# Patient Record
Sex: FEMALE | Race: WHITE | NOT HISPANIC OR LATINO | Employment: PART TIME | URBAN - METROPOLITAN AREA
[De-identification: names, ages, dates, MRNs, and addresses within clinical notes are randomized per-mention and may not be internally consistent; named-entity substitution may affect disease eponyms.]

---

## 2023-06-27 DIAGNOSIS — J01.21 ACUTE RECURRENT ETHMOIDAL SINUSITIS: ICD-10-CM

## 2023-06-28 RX ORDER — ALBUTEROL SULFATE 90 UG/1
AEROSOL, METERED RESPIRATORY (INHALATION)
Qty: 8.5 G | Refills: 0 | Status: SHIPPED | OUTPATIENT
Start: 2023-06-28 | End: 2024-01-23

## 2023-10-19 ENCOUNTER — HOSPITAL ENCOUNTER (OUTPATIENT)
Dept: RADIOLOGY | Facility: EXTERNAL LOCATION | Age: 58
Discharge: HOME | End: 2023-10-19

## 2023-10-19 ENCOUNTER — OFFICE VISIT (OUTPATIENT)
Dept: PRIMARY CARE | Facility: CLINIC | Age: 58
End: 2023-10-19
Payer: COMMERCIAL

## 2023-10-19 VITALS
HEIGHT: 61 IN | DIASTOLIC BLOOD PRESSURE: 97 MMHG | OXYGEN SATURATION: 96 % | BODY MASS INDEX: 23.41 KG/M2 | HEART RATE: 96 BPM | SYSTOLIC BLOOD PRESSURE: 147 MMHG | WEIGHT: 124 LBS | TEMPERATURE: 97.2 F

## 2023-10-19 DIAGNOSIS — F17.200 NICOTINE DEPENDENCE WITH CURRENT USE: ICD-10-CM

## 2023-10-19 DIAGNOSIS — Z12.31 ENCOUNTER FOR SCREENING MAMMOGRAM FOR MALIGNANT NEOPLASM OF BREAST: ICD-10-CM

## 2023-10-19 DIAGNOSIS — I10 BENIGN ESSENTIAL HYPERTENSION: ICD-10-CM

## 2023-10-19 DIAGNOSIS — Z00.00 VISIT FOR PREVENTIVE HEALTH EXAMINATION: Primary | ICD-10-CM

## 2023-10-19 DIAGNOSIS — Z12.2 ENCOUNTER FOR SCREENING FOR LUNG CANCER: ICD-10-CM

## 2023-10-19 PROCEDURE — 99214 OFFICE O/P EST MOD 30 MIN: CPT | Performed by: FAMILY MEDICINE

## 2023-10-19 PROCEDURE — 3080F DIAST BP >= 90 MM HG: CPT | Performed by: FAMILY MEDICINE

## 2023-10-19 PROCEDURE — 3077F SYST BP >= 140 MM HG: CPT | Performed by: FAMILY MEDICINE

## 2023-10-19 PROCEDURE — 99396 PREV VISIT EST AGE 40-64: CPT | Performed by: FAMILY MEDICINE

## 2023-10-19 RX ORDER — AMLODIPINE BESYLATE 5 MG/1
5 TABLET ORAL DAILY
COMMUNITY
End: 2023-10-19 | Stop reason: ALTCHOICE

## 2023-10-19 RX ORDER — AMLODIPINE AND OLMESARTAN MEDOXOMIL 10; 20 MG/1; MG/1
1 TABLET ORAL DAILY
Qty: 30 TABLET | Refills: 0 | Status: SHIPPED | OUTPATIENT
Start: 2023-10-19 | End: 2023-12-12

## 2023-10-19 NOTE — PROGRESS NOTES
Subjective   Patient ID: Carla Jalloh is a 58 y.o. female who presents for Annual Exam.    Assessment/Plan     Problem List Items Addressed This Visit    None  Visit Diagnoses       Visit for preventive health examination    -  Primary    Relevant Orders    TSH with reflex to Free T4 if abnormal    Lipid Panel    Comprehensive Metabolic Panel    CBC    Urinalysis with Reflex Microscopic    CT cardiac scoring wo IV contrast    CT lung screening low dose    Encounter for screening mammogram for malignant neoplasm of breast        Relevant Orders    BI mammo bilateral screening tomosynthesis (Completed)    Encounter for screening for lung cancer        Relevant Orders    CT lung screening low dose    Benign essential hypertension        Relevant Medications    amlodipine-olmesartan (Parrish) 10-20 mg tablet    Nicotine dependence with current use            Previously patient was advised to get all screenings -patient did not do anything   mammogram requisition provided  Told her to do Pap smear advised to see OB/GYN  Continue current medication  Requisition provided for CT chest lung cancer screening  Information provided  Coronary calcium score requisition provided  Pneumonia vaccine on next visit  Advised to get COVID-19 vaccine  Follow-up in a month  Discussed in detail about smoking cessation  Patient understands and in agreement plan.    HPI    58-year-old female here for physical and follow-up onhypertension -     Following up here after 1-1/2-year     Still smoking half pack to 1 pack/day history of smoking more than 40 years     Hypertension not well controlled we will switch medication  Follow-up in 2 to 4 weeks we will check BMP at that time  Fasting lab work ordered today       Did not do any preventive screening  Occasional alcohol use no drug use  No new sign and symptoms  Was seen by ENT status post laryngeal biopsy for persistent hoarseness of voice    No Known Allergies    Current Outpatient Medications  "  Medication Sig Dispense Refill    albuterol 90 mcg/actuation inhaler INHALE 1 TO 2 PUFFS BY MOUTH EVERY 4 TO 6 HOURS AS NEEDED 8.5 g 0    amlodipine-olmesartan (Parrish) 10-20 mg tablet Take 1 tablet by mouth once daily. 30 tablet 0     No current facility-administered medications for this visit.       Objective   Visit Vitals  BP (!) 147/97 (BP Location: Left arm, Patient Position: Sitting)   Pulse 96   Temp 36.2 °C (97.2 °F)   Ht 1.549 m (5' 1\")   Wt 56.2 kg (124 lb)   SpO2 96%   BMI 23.43 kg/m²   Smoking Status Every Day   BSA 1.56 m²     Physical Exam    Constitutional   General appearance: Alert and in no acute distress.   Head and Face   Head and face: Normal.     Palpation of the face and sinuses: Normal.    Eyes   Inspection of eyes: Sclera and conjunctiva were normal.    Pupil exam: Pupils were equal in size. Extraocular movements were intact.   Ears, Nose, Mouth, and Throat   Ears: Auricles: Normal.    Otoscopic examination: Tympanic membranes: Normal with no congestion and no discharge. Otic Canals: Normal without tenderness, congestion or discharge.    Hearing: Normal.     Nasal mucosa, septum, and turbinates: Normal without edema or erythema.    Lips, teeth, and gums: Normal.    Oropharynx: Normal with moist mucus membranes, no congestion. Tonsils: Normal no follicles.   Neck   Neck Exam: Appearance of the neck was normal. No neck masses observed.    Thyroid exam: Not enlarged and no palpable thyroid nodules.   Pulmonary   Respiratory assessment: No respiratory distress, normal respiratory rhythm and effort.    Auscultation of Lungs: Clear bilateral breath sounds.   Cardiovascular   Auscultation of heart: Apical pulse normal, heart rate and rhythm normal, normal S1 and S2, no murmurs and no pericardial rub.    Carotid arteries: Pulses normal with no bruits.    Exam for edema: No peripheral edema.   Chest   Chest: Normal A_P diameter, no pulsation, no intercostal withdrawing. Trachea central.   Abdomen "   Abdominal Exam: No bruits, normal bowel sounds, soft, non-tender, no abdominal mass palpated.    Liver and Spleen exam: No hepato-splenomegaly.    Examination for hernias: Normal.      Lymphatic   Palpation of lymph nodes in neck: No cervical lymphadenopathy.   Musculoskeletal   Examination of gait: Normal.    Inspection of digits and nails: No clubbing or cyanosis of the fingernails.    Inspection/palpation of joints, bones and muscles: No joint swelling. Normal movement of all extremities.    Range of Motion: Normal movement of all extremities.   Skin   Skin inspection: Normal skin color and pigmentation, normal skin turgor and no visible rash.   Neurologic   Cranial nerves: Nerves 2-12 were intact, no focal neuro defects.    Reflexes: Normal.     Sensation: Normal.     Coordination: Normal.    Psychiatric   Judgment and insight: Intact.    Orientation: Oriented to person, place, and time.    Recent and remote memory: Normal.     Mood and affect: Normal.     Genitourinary -follow-up with OB/GYN     Immunization History   Administered Date(s) Administered    Moderna SARS-CoV-2 Vaccination 05/06/2021, 05/31/2021       Review of Systems    No visits with results within 4 Month(s) from this visit.   Latest known visit with results is:   Legacy Encounter on 03/24/2021   Component Date Value Ref Range Status    WBC 03/24/2021 10.6  4.4 - 11.3 x10E9/L Final    nRBC 03/24/2021 0.0  0.0 - 0.0 /100 WBC Final    RBC 03/24/2021 4.33  4.00 - 5.20 x10E12/L Final    Hemoglobin 03/24/2021 14.1  12.0 - 16.0 g/dL Final    Hematocrit 03/24/2021 42.7  36.0 - 46.0 % Final    MCV 03/24/2021 99  80 - 100 fL Final    MCHC 03/24/2021 33.0  32.0 - 36.0 g/dL Final    Platelets 03/24/2021 321  150 - 450 x10E9/L Final    RDW 03/24/2021 12.7  11.5 - 14.5 % Final    Glucose 03/24/2021 91  74 - 99 mg/dL Final    Sodium 03/24/2021 139  136 - 145 mmol/L Final    Potassium 03/24/2021 4.4  3.5 - 5.3 mmol/L Final    Chloride 03/24/2021 103  98 -  107 mmol/L Final    Bicarbonate 03/24/2021 29  21 - 32 mmol/L Final    Anion Gap 03/24/2021 11  10 - 20 mmol/L Final    Urea Nitrogen 03/24/2021 11  6 - 23 mg/dL Final    Creatinine 03/24/2021 0.73  0.50 - 1.05 mg/dL Final    GLOMERULAR FILTRATION RATE-NON AFR* 03/24/2021 >60  >60 mL/min/1.73m2 Final    GLOMERULAR FILTRATION RATE-* 03/24/2021 >60  >60 mL/min/1.73m2 Final    Calcium 03/24/2021 10.3  8.6 - 10.6 mg/dL Final    Albumin 03/24/2021 4.4  3.4 - 5.0 g/dL Final    Alkaline Phosphatase 03/24/2021 101  33 - 110 U/L Final    Total Protein 03/24/2021 7.4  6.4 - 8.2 g/dL Final    AST 03/24/2021 17  9 - 39 U/L Final    Total Bilirubin 03/24/2021 0.3  0.0 - 1.2 mg/dL Final    ALT (SGPT) 03/24/2021 19  7 - 45 U/L Final    Cholesterol 03/24/2021 186  0 - 199 mg/dL Final    HDL 03/24/2021 91.6  mg/dL Final    Cholesterol/HDL Ratio 03/24/2021 2.0   Final    LDL 03/24/2021 76  0 - 99 mg/dL Final    VLDL 03/24/2021 19  0 - 40 mg/dL Final    Triglycerides 03/24/2021 93  0 - 149 mg/dL Final    Color, Urine 03/24/2021 YELLOW  STRAW,YELLOW Final    Appearance, Urine 03/24/2021 CLEAR  CLEAR Final    Specific Gravity, Urine 03/24/2021 1.014  1.005 - 1.035 Final    pH, Urine 03/24/2021 5.0  5.0 - 8.0 Final    Protein, Urine 03/24/2021 NEGATIVE  NEGATIVE mg/dL Final    Glucose, Urine 03/24/2021 NEGATIVE  NEGATIVE mg/dL Final    Blood, Urine 03/24/2021 NEGATIVE  NEGATIVE Final    Ketones, Urine 03/24/2021 NEGATIVE  NEGATIVE mg/dL Final    Bilirubin, Urine 03/24/2021 NEGATIVE  NEGATIVE Final    Urobilinogen, Urine 03/24/2021 <2.0  0.0 - 1.9 mg/dL Final    Nitrite, Urine 03/24/2021 NEGATIVE  NEGATIVE Final    Leukocyte Esterase, Urine 03/24/2021 NEGATIVE  NEGATIVE Final    TSH 03/24/2021 1.15  0.44 - 3.98 mIU/L Final    Vitamin D, 25-Hydroxy 03/24/2021 29 (A)  ng/mL Final    Total CK 03/24/2021 70  0 - 215 U/L Final       Radiology: Reviewed imaging in powerchart.  No results found.    No family history on file.  Social  History     Socioeconomic History    Marital status:      Spouse name: None    Number of children: None    Years of education: None    Highest education level: None   Occupational History    None   Tobacco Use    Smoking status: Every Day     Packs/day: 0.50     Years: 40.00     Additional pack years: 0.00     Total pack years: 20.00     Types: Cigarettes    Smokeless tobacco: Never   Substance and Sexual Activity    Alcohol use: Yes     Alcohol/week: 12.0 standard drinks of alcohol     Types: 3 Glasses of wine, 9 Cans of beer per week    Drug use: Never    Sexual activity: None   Other Topics Concern    None   Social History Narrative    None     Social Determinants of Health     Financial Resource Strain: Not on file   Food Insecurity: Not on file   Transportation Needs: Not on file   Physical Activity: Not on file   Stress: Not on file   Social Connections: Not on file   Intimate Partner Violence: Not on file   Housing Stability: Not on file     Past Medical History:   Diagnosis Date    Personal history of other diseases of the circulatory system     History of hypertension     Past Surgical History:   Procedure Laterality Date    OTHER SURGICAL HISTORY  02/03/2017    Excision Of Leg Soft Tissue Tumor    OTHER SURGICAL HISTORY  11/25/2019    Tooth extraction       Charting was completed using voice recognition technology and may include unintended errors.

## 2023-10-30 ENCOUNTER — LAB (OUTPATIENT)
Dept: LAB | Facility: LAB | Age: 58
End: 2023-10-30
Payer: COMMERCIAL

## 2023-10-30 DIAGNOSIS — Z00.00 VISIT FOR PREVENTIVE HEALTH EXAMINATION: ICD-10-CM

## 2023-10-30 LAB
ALBUMIN SERPL BCP-MCNC: 4.5 G/DL (ref 3.4–5)
ALP SERPL-CCNC: 111 U/L (ref 33–110)
ALT SERPL W P-5'-P-CCNC: 27 U/L (ref 7–45)
ANION GAP SERPL CALC-SCNC: 15 MMOL/L (ref 10–20)
APPEARANCE UR: CLEAR
AST SERPL W P-5'-P-CCNC: 18 U/L (ref 9–39)
BILIRUB SERPL-MCNC: 0.5 MG/DL (ref 0–1.2)
BILIRUB UR STRIP.AUTO-MCNC: NEGATIVE MG/DL
BUN SERPL-MCNC: 12 MG/DL (ref 6–23)
CALCIUM SERPL-MCNC: 9.9 MG/DL (ref 8.6–10.6)
CHLORIDE SERPL-SCNC: 107 MMOL/L (ref 98–107)
CHOLEST SERPL-MCNC: 209 MG/DL (ref 0–199)
CHOLESTEROL/HDL RATIO: 2.9
CO2 SERPL-SCNC: 23 MMOL/L (ref 21–32)
COLOR UR: YELLOW
CREAT SERPL-MCNC: 0.68 MG/DL (ref 0.5–1.05)
ERYTHROCYTE [DISTWIDTH] IN BLOOD BY AUTOMATED COUNT: 12.8 % (ref 11.5–14.5)
GFR SERPL CREATININE-BSD FRML MDRD: >90 ML/MIN/1.73M*2
GLUCOSE SERPL-MCNC: 93 MG/DL (ref 74–99)
GLUCOSE UR STRIP.AUTO-MCNC: NEGATIVE MG/DL
HCT VFR BLD AUTO: 47 % (ref 36–46)
HDLC SERPL-MCNC: 71.5 MG/DL
HGB BLD-MCNC: 14.9 G/DL (ref 12–16)
KETONES UR STRIP.AUTO-MCNC: NEGATIVE MG/DL
LDLC SERPL CALC-MCNC: 113 MG/DL
LEUKOCYTE ESTERASE UR QL STRIP.AUTO: NEGATIVE
MCH RBC QN AUTO: 32.1 PG (ref 26–34)
MCHC RBC AUTO-ENTMCNC: 31.7 G/DL (ref 32–36)
MCV RBC AUTO: 101 FL (ref 80–100)
NITRITE UR QL STRIP.AUTO: NEGATIVE
NON HDL CHOLESTEROL: 138 MG/DL (ref 0–149)
NRBC BLD-RTO: 0 /100 WBCS (ref 0–0)
PH UR STRIP.AUTO: 5 [PH]
PLATELET # BLD AUTO: 344 X10*3/UL (ref 150–450)
PMV BLD AUTO: 10.5 FL (ref 7.5–11.5)
POTASSIUM SERPL-SCNC: 4.6 MMOL/L (ref 3.5–5.3)
PROT SERPL-MCNC: 7.5 G/DL (ref 6.4–8.2)
PROT UR STRIP.AUTO-MCNC: NEGATIVE MG/DL
RBC # BLD AUTO: 4.64 X10*6/UL (ref 4–5.2)
RBC # UR STRIP.AUTO: NEGATIVE /UL
SODIUM SERPL-SCNC: 140 MMOL/L (ref 136–145)
SP GR UR STRIP.AUTO: 1.01
TRIGL SERPL-MCNC: 121 MG/DL (ref 0–149)
TSH SERPL-ACNC: 1.14 MIU/L (ref 0.44–3.98)
UROBILINOGEN UR STRIP.AUTO-MCNC: <2 MG/DL
VLDL: 24 MG/DL (ref 0–40)
WBC # BLD AUTO: 11.5 X10*3/UL (ref 4.4–11.3)

## 2023-10-30 PROCEDURE — 81003 URINALYSIS AUTO W/O SCOPE: CPT

## 2023-10-30 PROCEDURE — 36415 COLL VENOUS BLD VENIPUNCTURE: CPT

## 2023-10-30 PROCEDURE — 80061 LIPID PANEL: CPT

## 2023-10-30 PROCEDURE — 80053 COMPREHEN METABOLIC PANEL: CPT

## 2023-10-30 PROCEDURE — 84443 ASSAY THYROID STIM HORMONE: CPT

## 2023-10-30 PROCEDURE — 85027 COMPLETE CBC AUTOMATED: CPT

## 2023-11-02 ENCOUNTER — TELEMEDICINE (OUTPATIENT)
Dept: PRIMARY CARE | Facility: CLINIC | Age: 58
End: 2023-11-02
Payer: COMMERCIAL

## 2023-11-02 VITALS — WEIGHT: 124 LBS | HEIGHT: 61 IN | BODY MASS INDEX: 23.41 KG/M2

## 2023-11-02 DIAGNOSIS — F43.23 SITUATIONAL MIXED ANXIETY AND DEPRESSIVE DISORDER: Primary | ICD-10-CM

## 2023-11-02 DIAGNOSIS — G47.00 INSOMNIA, UNSPECIFIED TYPE: ICD-10-CM

## 2023-11-02 PROCEDURE — 99443 PR PHYS/QHP TELEPHONE EVALUATION 21-30 MIN: CPT | Performed by: STUDENT IN AN ORGANIZED HEALTH CARE EDUCATION/TRAINING PROGRAM

## 2023-11-02 RX ORDER — TRAZODONE HYDROCHLORIDE 50 MG/1
50 TABLET ORAL NIGHTLY PRN
Qty: 30 TABLET | Refills: 5 | Status: SHIPPED | OUTPATIENT
Start: 2023-11-02 | End: 2024-11-01

## 2023-11-02 RX ORDER — BUSPIRONE HYDROCHLORIDE 7.5 MG/1
7.5 TABLET ORAL 2 TIMES DAILY
Qty: 60 TABLET | Refills: 1 | Status: SHIPPED | OUTPATIENT
Start: 2023-11-02 | End: 2024-06-07 | Stop reason: SDUPTHER

## 2023-11-02 NOTE — PROGRESS NOTES
"Subjective   Patient ID: Carla Jalloh is a 58 y.o. female who presents for the following    Assessment/Plan   #Situational Anxiety  #Situational Depression  #Insomnia   -PHQ9 is 7; mild depression  -Has insomnia and anxiety about her mom being sick   -May have previous hx of depression but is unsure    PLAN  -Buspar 7.5mg PO BID  -Trazodone 50mg PO at bedtime for sleep   -If symptoms worsen, can start paxil or sertraline for MDD.   -Advised to seek emergency care for SI/HI or return back to PCP if anxiety/depression symptoms to do improve.       Follow up with PCP    HPI  Virtual or Telephone Consent    A telephone visit (audio only) between the patient (at the originating site) and the provider (at the distant site) was utilized to provide this telehealth service.   Verbal consent was requested and obtained from Carla Jalloh on this date, 11/02/23 for a telehealth visit.     58F presents for virtual visit via telephone. She states that her mental state is deteriorating due to stressors in her life. Her mom was recently in the hospital and her work life is stressful. She began to cry at work today due to her current situation and was sent home which has worsened her anxiety. She states that people talking loudly around her make her anxious and angry and that she has become more irritable since her mother got sick. No previous documented hx of depression or anxiety seen.   Denies any SI/HI.  She thinks that if she could get better sleep (only sleeping a few hours a night) then her mood would improve significantly. Appetite is intact.     ROS otherwise negative.      PHQ9: 7; mild     Visit Vitals  Ht 1.549 m (5' 1\")   Wt 56.2 kg (124 lb)   BMI 23.43 kg/m²   Smoking Status Every Day   BSA 1.56 m²     PHYSICAL EXAM     Deferred  REVIEW OF SYSTEMS   In ROS     No Known Allergies    Current Outpatient Medications   Medication Sig Dispense Refill    albuterol 90 mcg/actuation inhaler INHALE 1 TO 2 PUFFS BY MOUTH EVERY " 4 TO 6 HOURS AS NEEDED 8.5 g 0    amlodipine-olmesartan (Parrsih) 10-20 mg tablet Take 1 tablet by mouth once daily. 30 tablet 0     No current facility-administered medications for this visit.       Objective     Lab on 10/30/2023   Component Date Value Ref Range Status    Thyroid Stimulating Hormone 10/30/2023 1.14  0.44 - 3.98 mIU/L Final    Cholesterol 10/30/2023 209 (H)  0 - 199 mg/dL Final    HDL-Cholesterol 10/30/2023 71.5  mg/dL Final    Cholesterol/HDL Ratio 10/30/2023 2.9   Final    LDL Calculated 10/30/2023 113 (H)  <=99 mg/dL Final    VLDL 10/30/2023 24  0 - 40 mg/dL Final    Triglycerides 10/30/2023 121  0 - 149 mg/dL Final    Non HDL Cholesterol 10/30/2023 138  0 - 149 mg/dL Final    Glucose 10/30/2023 93  74 - 99 mg/dL Final    Sodium 10/30/2023 140  136 - 145 mmol/L Final    Potassium 10/30/2023 4.6  3.5 - 5.3 mmol/L Final    Chloride 10/30/2023 107  98 - 107 mmol/L Final    Bicarbonate 10/30/2023 23  21 - 32 mmol/L Final    Anion Gap 10/30/2023 15  10 - 20 mmol/L Final    Urea Nitrogen 10/30/2023 12  6 - 23 mg/dL Final    Creatinine 10/30/2023 0.68  0.50 - 1.05 mg/dL Final    eGFR 10/30/2023 >90  >60 mL/min/1.73m*2 Final    Calcium 10/30/2023 9.9  8.6 - 10.6 mg/dL Final    Albumin 10/30/2023 4.5  3.4 - 5.0 g/dL Final    Alkaline Phosphatase 10/30/2023 111 (H)  33 - 110 U/L Final    Total Protein 10/30/2023 7.5  6.4 - 8.2 g/dL Final    AST 10/30/2023 18  9 - 39 U/L Final    Bilirubin, Total 10/30/2023 0.5  0.0 - 1.2 mg/dL Final    ALT 10/30/2023 27  7 - 45 U/L Final    WBC 10/30/2023 11.5 (H)  4.4 - 11.3 x10*3/uL Final    nRBC 10/30/2023 0.0  0.0 - 0.0 /100 WBCs Final    RBC 10/30/2023 4.64  4.00 - 5.20 x10*6/uL Final    Hemoglobin 10/30/2023 14.9  12.0 - 16.0 g/dL Final    Hematocrit 10/30/2023 47.0 (H)  36.0 - 46.0 % Final    MCV 10/30/2023 101 (H)  80 - 100 fL Final    MCH 10/30/2023 32.1  26.0 - 34.0 pg Final    MCHC 10/30/2023 31.7 (L)  32.0 - 36.0 g/dL Final    RDW 10/30/2023 12.8  11.5 - 14.5 %  Final    Platelets 10/30/2023 344  150 - 450 x10*3/uL Final    MPV 10/30/2023 10.5  7.5 - 11.5 fL Final    Color, Urine 10/30/2023 Yellow  Straw, Yellow Final    Appearance, Urine 10/30/2023 Clear  Clear Final    Specific Gravity, Urine 10/30/2023 1.014  1.005 - 1.035 Final    pH, Urine 10/30/2023 5.0  5.0, 5.5, 6.0, 6.5, 7.0, 7.5, 8.0 Final    Protein, Urine 10/30/2023 NEGATIVE  NEGATIVE mg/dL Final    Glucose, Urine 10/30/2023 NEGATIVE  NEGATIVE mg/dL Final    Blood, Urine 10/30/2023 NEGATIVE  NEGATIVE Final    Ketones, Urine 10/30/2023 NEGATIVE  NEGATIVE mg/dL Final    Bilirubin, Urine 10/30/2023 NEGATIVE  NEGATIVE Final    Urobilinogen, Urine 10/30/2023 <2.0  <2.0 mg/dL Final    Nitrite, Urine 10/30/2023 NEGATIVE  NEGATIVE Final    Leukocyte Esterase, Urine 10/30/2023 NEGATIVE  NEGATIVE Final       Radiology: Reviewed imaging in powerchart.  BI mammo bilateral screening tomosynthesis    Result Date: 10/19/2023  These images are not reportable by radiology and will not be interpreted by  Radiologists.      No family history on file.  Social History     Socioeconomic History    Marital status:      Spouse name: None    Number of children: None    Years of education: None    Highest education level: None   Occupational History    None   Tobacco Use    Smoking status: Every Day     Packs/day: 0.50     Years: 40.00     Additional pack years: 0.00     Total pack years: 20.00     Types: Cigarettes    Smokeless tobacco: Never   Substance and Sexual Activity    Alcohol use: Yes     Alcohol/week: 12.0 standard drinks of alcohol     Types: 3 Glasses of wine, 9 Cans of beer per week    Drug use: Never    Sexual activity: None   Other Topics Concern    None   Social History Narrative    None     Social Determinants of Health     Financial Resource Strain: Not on file   Food Insecurity: Not on file   Transportation Needs: Not on file   Physical Activity: Not on file   Stress: Not on file   Social Connections: Not on  file   Intimate Partner Violence: Not on file   Housing Stability: Not on file     Past Medical History:   Diagnosis Date    Personal history of other diseases of the circulatory system     History of hypertension     Past Surgical History:   Procedure Laterality Date    OTHER SURGICAL HISTORY  02/03/2017    Excision Of Leg Soft Tissue Tumor    OTHER SURGICAL HISTORY  11/25/2019    Tooth extraction       Charting was completed using voice recognition technology and may include unintended errors.

## 2023-11-09 ENCOUNTER — HOSPITAL ENCOUNTER (OUTPATIENT)
Dept: RADIOLOGY | Facility: HOSPITAL | Age: 58
Discharge: HOME | End: 2023-11-09
Payer: COMMERCIAL

## 2023-11-09 DIAGNOSIS — Z00.00 VISIT FOR PREVENTIVE HEALTH EXAMINATION: ICD-10-CM

## 2023-11-09 PROCEDURE — 75571 CT HRT W/O DYE W/CA TEST: CPT

## 2023-11-14 ENCOUNTER — TELEPHONE (OUTPATIENT)
Dept: PRIMARY CARE | Facility: CLINIC | Age: 58
End: 2023-11-14
Payer: COMMERCIAL

## 2023-11-14 DIAGNOSIS — R74.8 CARDIAC ENZYMES ELEVATED: ICD-10-CM

## 2023-11-14 NOTE — TELEPHONE ENCOUNTER
PT STOPPED BY OFFICE. WOULD LIKE TO START ROSUVASTATIN PER RECOMMENDATION. ALSO STATES FOR HIATAL HERNIA PPI WOULD BE RECOMMENDED IN WHICH SHE WOULD LIKE    FRANCIS JANSEN

## 2023-11-14 NOTE — TELEPHONE ENCOUNTER
----- Message from Deion Landers MD sent at 11/14/2023  9:06 AM EST -----  Please call the patient regarding her abnormal result.  With elevated calcium score and with the blood pressure current age and elevated LDL would qualify for low-dose cholesterol medication if patient agrees to start patient on rosuvastatin 5 mg daily.  Monitor for myalgia.  Stay hydrated.

## 2023-11-16 RX ORDER — ROSUVASTATIN CALCIUM 5 MG/1
5 TABLET, COATED ORAL DAILY
Qty: 30 TABLET | Refills: 2 | Status: SHIPPED | OUTPATIENT
Start: 2023-11-16 | End: 2024-05-14

## 2023-12-06 DIAGNOSIS — I10 BENIGN ESSENTIAL HYPERTENSION: ICD-10-CM

## 2023-12-12 DIAGNOSIS — I10 BENIGN ESSENTIAL HYPERTENSION: ICD-10-CM

## 2023-12-12 RX ORDER — AMLODIPINE AND OLMESARTAN MEDOXOMIL 10; 20 MG/1; MG/1
1 TABLET ORAL DAILY
Qty: 90 TABLET | Refills: 2 | Status: SHIPPED | OUTPATIENT
Start: 2023-12-12 | End: 2023-12-12 | Stop reason: SDUPTHER

## 2023-12-13 RX ORDER — AMLODIPINE AND OLMESARTAN MEDOXOMIL 10; 20 MG/1; MG/1
1 TABLET ORAL DAILY
Qty: 90 TABLET | Refills: 2 | Status: SHIPPED | OUTPATIENT
Start: 2023-12-13 | End: 2024-12-12

## 2024-01-19 DIAGNOSIS — J01.21 ACUTE RECURRENT ETHMOIDAL SINUSITIS: ICD-10-CM

## 2024-01-23 RX ORDER — ALBUTEROL SULFATE 90 UG/1
AEROSOL, METERED RESPIRATORY (INHALATION)
Qty: 8.5 G | Refills: 2 | Status: SHIPPED | OUTPATIENT
Start: 2024-01-23

## 2024-06-07 DIAGNOSIS — F43.23 SITUATIONAL MIXED ANXIETY AND DEPRESSIVE DISORDER: ICD-10-CM

## 2024-06-07 RX ORDER — BUSPIRONE HYDROCHLORIDE 7.5 MG/1
7.5 TABLET ORAL 2 TIMES DAILY
Qty: 60 TABLET | Refills: 0 | Status: SHIPPED | OUTPATIENT
Start: 2024-06-07 | End: 2025-06-07

## 2024-07-05 DIAGNOSIS — F43.23 SITUATIONAL MIXED ANXIETY AND DEPRESSIVE DISORDER: ICD-10-CM

## 2024-07-24 RX ORDER — BUSPIRONE HYDROCHLORIDE 7.5 MG/1
7.5 TABLET ORAL 2 TIMES DAILY
Qty: 60 TABLET | Refills: 2 | Status: SHIPPED | OUTPATIENT
Start: 2024-07-24

## 2024-09-13 DIAGNOSIS — R74.8 CARDIAC ENZYMES ELEVATED: ICD-10-CM

## 2024-09-16 RX ORDER — ROSUVASTATIN CALCIUM 5 MG/1
5 TABLET, COATED ORAL DAILY
Qty: 30 TABLET | Refills: 0 | Status: SHIPPED | OUTPATIENT
Start: 2024-09-16 | End: 2024-10-16

## 2025-03-21 ENCOUNTER — APPOINTMENT (OUTPATIENT)
Dept: PRIMARY CARE | Facility: CLINIC | Age: 60
End: 2025-03-21
Payer: COMMERCIAL

## 2025-03-21 VITALS
OXYGEN SATURATION: 98 % | HEIGHT: 61 IN | SYSTOLIC BLOOD PRESSURE: 190 MMHG | WEIGHT: 127 LBS | DIASTOLIC BLOOD PRESSURE: 95 MMHG | BODY MASS INDEX: 23.98 KG/M2 | TEMPERATURE: 97.8 F | HEART RATE: 92 BPM

## 2025-03-21 DIAGNOSIS — Z87.891 HISTORY OF SMOKING 10-25 PACK YEARS: ICD-10-CM

## 2025-03-21 DIAGNOSIS — I10 BENIGN ESSENTIAL HYPERTENSION: Primary | ICD-10-CM

## 2025-03-21 DIAGNOSIS — E78.2 MIXED HYPERLIPIDEMIA: ICD-10-CM

## 2025-03-21 DIAGNOSIS — Z00.00 VISIT FOR PREVENTIVE HEALTH EXAMINATION: ICD-10-CM

## 2025-03-21 DIAGNOSIS — F17.200 NICOTINE DEPENDENCE WITH CURRENT USE: ICD-10-CM

## 2025-03-21 DIAGNOSIS — F43.23 SITUATIONAL MIXED ANXIETY AND DEPRESSIVE DISORDER: ICD-10-CM

## 2025-03-21 PROCEDURE — 99396 PREV VISIT EST AGE 40-64: CPT | Performed by: FAMILY MEDICINE

## 2025-03-21 PROCEDURE — 99214 OFFICE O/P EST MOD 30 MIN: CPT | Performed by: FAMILY MEDICINE

## 2025-03-21 PROCEDURE — 3008F BODY MASS INDEX DOCD: CPT | Performed by: FAMILY MEDICINE

## 2025-03-21 PROCEDURE — 3077F SYST BP >= 140 MM HG: CPT | Performed by: FAMILY MEDICINE

## 2025-03-21 PROCEDURE — 3080F DIAST BP >= 90 MM HG: CPT | Performed by: FAMILY MEDICINE

## 2025-03-21 RX ORDER — ESTRADIOL 0.1 MG/G
CREAM VAGINAL
COMMUNITY
Start: 2023-12-04

## 2025-03-21 RX ORDER — ROSUVASTATIN CALCIUM 5 MG/1
5 TABLET, COATED ORAL DAILY
Qty: 90 TABLET | Refills: 2 | Status: SHIPPED | OUTPATIENT
Start: 2025-03-21 | End: 2026-03-21

## 2025-03-21 RX ORDER — AMLODIPINE AND OLMESARTAN MEDOXOMIL 10; 20 MG/1; MG/1
1 TABLET ORAL DAILY
Qty: 90 TABLET | Refills: 2 | Status: CANCELLED | OUTPATIENT
Start: 2025-03-21 | End: 2026-03-21

## 2025-03-21 RX ORDER — AMLODIPINE AND OLMESARTAN MEDOXOMIL 10; 40 MG/1; MG/1
1 TABLET ORAL DAILY
Qty: 90 TABLET | Refills: 3 | Status: SHIPPED | OUTPATIENT
Start: 2025-03-21 | End: 2026-03-21

## 2025-03-21 RX ORDER — BUSPIRONE HYDROCHLORIDE 10 MG/1
10 TABLET ORAL 2 TIMES DAILY
Qty: 180 TABLET | Refills: 1 | Status: SHIPPED | OUTPATIENT
Start: 2025-03-21

## 2025-03-21 RX ORDER — ALBUTEROL SULFATE 90 UG/1
2 INHALANT RESPIRATORY (INHALATION) EVERY 4 HOURS PRN
Qty: 8.5 G | Refills: 2 | Status: SHIPPED | OUTPATIENT
Start: 2025-03-21

## 2025-03-21 NOTE — PROGRESS NOTES
Subjective   Patient ID: Carla Jalloh is a 59 y.o. female who presents for Annual Exam (Pt is fasting/Declined flu/Hasn't been on statin for 9 months/Only taking bp med her & there).    Assessment/Plan     Problem List Items Addressed This Visit       Visit for preventive health examination    Relevant Orders    TSH with reflex to Free T4 if abnormal    Lipid Panel    Comprehensive Metabolic Panel    CBC    Urinalysis with Reflex Microscopic    Benign essential hypertension - Primary    Relevant Medications    amlodipine-olmesartan (Parrish) 10-40 mg tablet    Other Relevant Orders    TSH with reflex to Free T4 if abnormal    Lipid Panel    Comprehensive Metabolic Panel    CBC    Urinalysis with Reflex Microscopic    Mixed hyperlipidemia    Relevant Medications    rosuvastatin (Crestor) 5 mg tablet    Other Relevant Orders    TSH with reflex to Free T4 if abnormal    Lipid Panel    Comprehensive Metabolic Panel    CBC    Urinalysis with Reflex Microscopic    Situational mixed anxiety and depressive disorder    Relevant Medications    busPIRone (Buspar) 10 mg tablet     Other Visit Diagnoses       Nicotine dependence with current use        Relevant Medications    albuterol 90 mcg/actuation inhaler    History of smoking 10-25 pack years        Relevant Orders    CT lung screening low dose        Previously patient was advised to get all screenings -patient did not do anything   mammogram requisition provided - last in 10/23 - on next visit  Told her to do Pap smear advised to see OB/GYN  Continue current medication  Requisition provided for CT chest lung cancer screening    Coronary calcium score - Total 136.46   Pneumonia vaccine - done  Advised to get COVID-19 vaccine  Follow-up in a month  Discussed in detail about smoking cessation  Patient understands and in agreement plan.    Follow-up in 2 to 4 weeks  Monitor blood pressure at home    HPI    58-year-old female here for physical and follow-up onhypertension -    "  Following up here after 1-1/2-year     Still smoking half pack to 1 pack/day history of smoking more than 40 years     Hypertension not well controlled will increase medication  Advised patient to follow-up in 2 to 4 weeks    Patient is upset and crying about family situation patient has more anxiety will increase BuSpar for now  Consider behavior therapy consultation  Next visit we may consider adding Celexa  Did not do any preventive screening  Occasional alcohol use no drug use  No new sign and symptoms  Was seen by ENT status post laryngeal biopsy for persistent hoarseness of voice    No Known Allergies    Current Outpatient Medications   Medication Sig Dispense Refill   • albuterol 90 mcg/actuation inhaler Inhale 2 puffs every 4 hours if needed for wheezing. 8.5 g 2   • amlodipine-olmesartan (Parrish) 10-40 mg tablet Take 1 tablet by mouth once daily. 90 tablet 3   • busPIRone (Buspar) 10 mg tablet Take 1 tablet (10 mg) by mouth 2 times a day. 180 tablet 1   • estradiol (Estrace) 0.01 % (0.1 mg/gram) vaginal cream See Instructions, 1 g Vaginal HS Mon, Thurs, 42.5 g, Date: 12/4/23 3:24:00 PM EST, [facility], Instructions Replace Required Details, 1 g Vaginal HS Mon, Thurs (Patient not taking: Reported on 3/21/2025)     • rosuvastatin (Crestor) 5 mg tablet Take 1 tablet (5 mg) by mouth once daily. 90 tablet 2     No current facility-administered medications for this visit.       Objective   Visit Vitals  BP (!) 190/95 (BP Location: Left arm, Patient Position: Sitting)   Pulse 92   Temp 36.6 °C (97.8 °F)   Ht 1.549 m (5' 1\")   Wt 57.6 kg (127 lb)   SpO2 98%   BMI 24.00 kg/m²   Smoking Status Every Day   BSA 1.57 m²     Physical Exam    Constitutional   General appearance: Alert and in no acute distress.   Head and Face   Head and face: Normal.     Palpation of the face and sinuses: Normal.    Eyes   Inspection of eyes: Sclera and conjunctiva were normal.    Pupil exam: Pupils were equal in size. Extraocular " movements were intact.   Ears, Nose, Mouth, and Throat   Ears: Auricles: Normal.    Otoscopic examination: Tympanic membranes: Normal with no congestion and no discharge. Otic Canals: Normal without tenderness, congestion or discharge.    Hearing: Normal.     Nasal mucosa, septum, and turbinates: Normal without edema or erythema.    Lips, teeth, and gums: Normal.    Oropharynx: Normal with moist mucus membranes, no congestion. Tonsils: Normal no follicles.   Neck   Neck Exam: Appearance of the neck was normal. No neck masses observed.    Thyroid exam: Not enlarged and no palpable thyroid nodules.   Pulmonary   Respiratory assessment: No respiratory distress, normal respiratory rhythm and effort.    Auscultation of Lungs: Clear bilateral breath sounds.   Cardiovascular   Auscultation of heart: Apical pulse normal, heart rate and rhythm normal, normal S1 and S2, no murmurs and no pericardial rub.    Carotid arteries: Pulses normal with no bruits.    Exam for edema: No peripheral edema.   Chest   Chest: Normal A_P diameter, no pulsation, no intercostal withdrawing. Trachea central.   Abdomen   Abdominal Exam: No bruits, normal bowel sounds, soft, non-tender, no abdominal mass palpated.    Liver and Spleen exam: No hepato-splenomegaly.    Examination for hernias: Normal.      Lymphatic   Palpation of lymph nodes in neck: No cervical lymphadenopathy.   Musculoskeletal   Examination of gait: Normal.    Inspection of digits and nails: No clubbing or cyanosis of the fingernails.    Inspection/palpation of joints, bones and muscles: No joint swelling. Normal movement of all extremities.    Range of Motion: Normal movement of all extremities.   Skin   Skin inspection: Normal skin color and pigmentation, normal skin turgor and no visible rash.   Neurologic   Cranial nerves: Nerves 2-12 were intact, no focal neuro defects.    Reflexes: Normal.     Sensation: Normal.     Coordination: Normal.    Psychiatric   Judgment and  insight: Intact.    Orientation: Oriented to person, place, and time.    Recent and remote memory: Normal.     Mood and affect: Normal.     Genitourinary -follow-up with OB/GYN     Immunization History   Administered Date(s) Administered   • Moderna SARS-CoV-2 Vaccination 05/06/2021, 05/31/2021   • Pneumococcal conjugate vaccine, 20-valent (PREVNAR 20) 01/26/2023       Review of Systems    No visits with results within 4 Month(s) from this visit.   Latest known visit with results is:   Lab on 10/30/2023   Component Date Value Ref Range Status   • Thyroid Stimulating Hormone 10/30/2023 1.14  0.44 - 3.98 mIU/L Final   • Cholesterol 10/30/2023 209 (H)  0 - 199 mg/dL Final   • HDL-Cholesterol 10/30/2023 71.5  mg/dL Final   • Cholesterol/HDL Ratio 10/30/2023 2.9   Final   • LDL Calculated 10/30/2023 113 (H)  <=99 mg/dL Final   • VLDL 10/30/2023 24  0 - 40 mg/dL Final   • Triglycerides 10/30/2023 121  0 - 149 mg/dL Final   • Non HDL Cholesterol 10/30/2023 138  0 - 149 mg/dL Final   • Glucose 10/30/2023 93  74 - 99 mg/dL Final   • Sodium 10/30/2023 140  136 - 145 mmol/L Final   • Potassium 10/30/2023 4.6  3.5 - 5.3 mmol/L Final   • Chloride 10/30/2023 107  98 - 107 mmol/L Final   • Bicarbonate 10/30/2023 23  21 - 32 mmol/L Final   • Anion Gap 10/30/2023 15  10 - 20 mmol/L Final   • Urea Nitrogen 10/30/2023 12  6 - 23 mg/dL Final   • Creatinine 10/30/2023 0.68  0.50 - 1.05 mg/dL Final   • eGFR 10/30/2023 >90  >60 mL/min/1.73m*2 Final   • Calcium 10/30/2023 9.9  8.6 - 10.6 mg/dL Final   • Albumin 10/30/2023 4.5  3.4 - 5.0 g/dL Final   • Alkaline Phosphatase 10/30/2023 111 (H)  33 - 110 U/L Final   • Total Protein 10/30/2023 7.5  6.4 - 8.2 g/dL Final   • AST 10/30/2023 18  9 - 39 U/L Final   • Bilirubin, Total 10/30/2023 0.5  0.0 - 1.2 mg/dL Final   • ALT 10/30/2023 27  7 - 45 U/L Final   • WBC 10/30/2023 11.5 (H)  4.4 - 11.3 x10*3/uL Final   • nRBC 10/30/2023 0.0  0.0 - 0.0 /100 WBCs Final   • RBC 10/30/2023 4.64  4.00 -  5.20 x10*6/uL Final   • Hemoglobin 10/30/2023 14.9  12.0 - 16.0 g/dL Final   • Hematocrit 10/30/2023 47.0 (H)  36.0 - 46.0 % Final   • MCV 10/30/2023 101 (H)  80 - 100 fL Final   • MCH 10/30/2023 32.1  26.0 - 34.0 pg Final   • MCHC 10/30/2023 31.7 (L)  32.0 - 36.0 g/dL Final   • RDW 10/30/2023 12.8  11.5 - 14.5 % Final   • Platelets 10/30/2023 344  150 - 450 x10*3/uL Final   • MPV 10/30/2023 10.5  7.5 - 11.5 fL Final   • Color, Urine 10/30/2023 Yellow  Straw, Yellow Final   • Appearance, Urine 10/30/2023 Clear  Clear Final   • Specific Gravity, Urine 10/30/2023 1.014  1.005 - 1.035 Final   • pH, Urine 10/30/2023 5.0  5.0, 5.5, 6.0, 6.5, 7.0, 7.5, 8.0 Final   • Protein, Urine 10/30/2023 NEGATIVE  NEGATIVE mg/dL Final   • Glucose, Urine 10/30/2023 NEGATIVE  NEGATIVE mg/dL Final   • Blood, Urine 10/30/2023 NEGATIVE  NEGATIVE Final   • Ketones, Urine 10/30/2023 NEGATIVE  NEGATIVE mg/dL Final   • Bilirubin, Urine 10/30/2023 NEGATIVE  NEGATIVE Final   • Urobilinogen, Urine 10/30/2023 <2.0  <2.0 mg/dL Final   • Nitrite, Urine 10/30/2023 NEGATIVE  NEGATIVE Final   • Leukocyte Esterase, Urine 10/30/2023 NEGATIVE  NEGATIVE Final       Radiology: Reviewed imaging in powerchart.  No results found.    No family history on file.  Social History     Socioeconomic History   • Marital status:    Tobacco Use   • Smoking status: Every Day     Current packs/day: 0.50     Average packs/day: 0.5 packs/day for 40.0 years (20.0 ttl pk-yrs)     Types: Cigarettes   • Smokeless tobacco: Never   Substance and Sexual Activity   • Alcohol use: Yes     Alcohol/week: 9.0 standard drinks of alcohol     Types: 3 Glasses of wine, 6 Cans of beer per week   • Drug use: Never     Past Medical History:   Diagnosis Date   • Personal history of other diseases of the circulatory system     History of hypertension     Past Surgical History:   Procedure Laterality Date   • OTHER SURGICAL HISTORY  02/03/2017    Excision Of Leg Soft Tissue Tumor   •  OTHER SURGICAL HISTORY  11/25/2019    Tooth extraction       Charting was completed using voice recognition technology and may include unintended errors.

## 2025-03-22 LAB
ALBUMIN SERPL-MCNC: 4.6 G/DL (ref 3.6–5.1)
ALP SERPL-CCNC: 96 U/L (ref 37–153)
ALT SERPL-CCNC: 30 U/L (ref 6–29)
ANION GAP SERPL CALCULATED.4IONS-SCNC: 11 MMOL/L (CALC) (ref 7–17)
APPEARANCE UR: CLEAR
AST SERPL-CCNC: 25 U/L (ref 10–35)
BILIRUB SERPL-MCNC: 0.4 MG/DL (ref 0.2–1.2)
BILIRUB UR QL STRIP: NEGATIVE
BUN SERPL-MCNC: 12 MG/DL (ref 7–25)
CALCIUM SERPL-MCNC: 10 MG/DL (ref 8.6–10.4)
CHLORIDE SERPL-SCNC: 105 MMOL/L (ref 98–110)
CHOLEST SERPL-MCNC: 222 MG/DL
CHOLEST/HDLC SERPL: 2.4 (CALC)
CO2 SERPL-SCNC: 25 MMOL/L (ref 20–32)
COLOR UR: YELLOW
CREAT SERPL-MCNC: 0.65 MG/DL (ref 0.5–1.05)
EGFRCR SERPLBLD CKD-EPI 2021: 101 ML/MIN/1.73M2
ERYTHROCYTE [DISTWIDTH] IN BLOOD BY AUTOMATED COUNT: 12.7 % (ref 11–15)
GLUCOSE SERPL-MCNC: 95 MG/DL (ref 65–99)
GLUCOSE UR QL STRIP: NEGATIVE
HCT VFR BLD AUTO: 44.2 % (ref 35–45)
HDLC SERPL-MCNC: 91 MG/DL
HGB BLD-MCNC: 14.8 G/DL (ref 11.7–15.5)
HGB UR QL STRIP: NEGATIVE
KETONES UR QL STRIP: NEGATIVE
LDLC SERPL CALC-MCNC: 112 MG/DL (CALC)
LEUKOCYTE ESTERASE UR QL STRIP: NEGATIVE
MCH RBC QN AUTO: 32.1 PG (ref 27–33)
MCHC RBC AUTO-ENTMCNC: 33.5 G/DL (ref 32–36)
MCV RBC AUTO: 95.9 FL (ref 80–100)
NITRITE UR QL STRIP: NEGATIVE
NONHDLC SERPL-MCNC: 131 MG/DL (CALC)
PH UR STRIP: 5.5 [PH] (ref 5–8)
PLATELET # BLD AUTO: 323 THOUSAND/UL (ref 140–400)
PMV BLD REES-ECKER: 10.6 FL (ref 7.5–12.5)
POTASSIUM SERPL-SCNC: 4.5 MMOL/L (ref 3.5–5.3)
PROT SERPL-MCNC: 7.7 G/DL (ref 6.1–8.1)
PROT UR QL STRIP: NEGATIVE
RBC # BLD AUTO: 4.61 MILLION/UL (ref 3.8–5.1)
SODIUM SERPL-SCNC: 141 MMOL/L (ref 135–146)
SP GR UR STRIP: 1.01 (ref 1–1.03)
TRIGL SERPL-MCNC: 88 MG/DL
TSH SERPL-ACNC: 1.37 MIU/L (ref 0.4–4.5)
WBC # BLD AUTO: 9.4 THOUSAND/UL (ref 3.8–10.8)